# Patient Record
Sex: MALE | Race: OTHER | HISPANIC OR LATINO | Employment: UNEMPLOYED | URBAN - METROPOLITAN AREA
[De-identification: names, ages, dates, MRNs, and addresses within clinical notes are randomized per-mention and may not be internally consistent; named-entity substitution may affect disease eponyms.]

---

## 2020-09-21 ENCOUNTER — APPOINTMENT (EMERGENCY)
Dept: RADIOLOGY | Facility: HOSPITAL | Age: 45
End: 2020-09-21

## 2020-09-21 ENCOUNTER — HOSPITAL ENCOUNTER (EMERGENCY)
Facility: HOSPITAL | Age: 45
Discharge: HOME | End: 2020-09-21
Attending: EMERGENCY MEDICINE | Admitting: EMERGENCY MEDICINE

## 2020-09-21 VITALS
TEMPERATURE: 97.6 F | OXYGEN SATURATION: 99 % | WEIGHT: 174.38 LBS | DIASTOLIC BLOOD PRESSURE: 65 MMHG | RESPIRATION RATE: 18 BRPM | SYSTOLIC BLOOD PRESSURE: 163 MMHG | HEART RATE: 81 BPM

## 2020-09-21 DIAGNOSIS — M25.531 RIGHT WRIST PAIN: Primary | ICD-10-CM

## 2020-09-21 PROCEDURE — 99284 EMERGENCY DEPT VISIT MOD MDM: CPT | Performed by: EMERGENCY MEDICINE

## 2020-09-21 PROCEDURE — 99283 EMERGENCY DEPT VISIT LOW MDM: CPT

## 2020-09-21 PROCEDURE — 73110 X-RAY EXAM OF WRIST: CPT

## 2020-09-21 RX ORDER — IBUPROFEN 600 MG/1
600 TABLET ORAL ONCE
Status: COMPLETED | OUTPATIENT
Start: 2020-09-21 | End: 2020-09-21

## 2020-09-21 RX ADMIN — IBUPROFEN 600 MG: 600 TABLET, FILM COATED ORAL at 22:48

## 2020-09-21 NOTE — Clinical Note
Boom Holloway was seen and treated in our emergency department on 9/21/2020  Diagnosis:     Ciaran Charles  may return to work on return date  He may return on this date: 09/24/2020         If you have any questions or concerns, please don't hesitate to call        Arvind Quintanilla MD    ______________________________           _______________          _______________  Hospital Representative                              Date                                Time

## 2020-09-22 NOTE — ED PROVIDER NOTES
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  Pain is worse with movement  No redness, swelling, numbness, tingling, weakness  No hx prior injury  No falls or direct trauma  No other complaints at this time  None       History reviewed  No pertinent past medical history  History reviewed  No pertinent surgical history  History reviewed  No pertinent family history  I have reviewed and agree with the history as documented  E-Cigarette/Vaping     E-Cigarette/Vaping Substances     Social History     Tobacco Use    Smoking status: Never Smoker    Smokeless tobacco: Never Used   Substance Use Topics    Alcohol use: Never     Frequency: Never    Drug use: Never        Review of Systems   Constitutional: Negative for chills and fever  HENT: Negative for congestion, rhinorrhea and sore throat  Respiratory: Negative for cough and shortness of breath  Cardiovascular: Negative for chest pain and palpitations  Gastrointestinal: Negative for abdominal pain, nausea and vomiting  Genitourinary: Negative for dysuria and hematuria  Musculoskeletal: Positive for arthralgias and myalgias  Skin: Negative for rash  Neurological: Negative for dizziness, weakness, light-headedness, numbness and headaches  All other systems reviewed and are negative        Physical Exam  ED Triage Vitals   Temperature Pulse Respirations Blood Pressure SpO2   09/21/20 2240 09/21/20 2239 09/21/20 2239 09/21/20 2240 09/21/20 2239   97 6 °F (36 4 °C) 81 18 163/65 99 %      Temp Source Heart Rate Source Patient Position - Orthostatic VS BP Location FiO2 (%)   09/21/20 2240 09/21/20 2239 09/21/20 2239 09/21/20 2239 --   Tympanic Monitor Sitting Left arm       Pain Score       09/21/20 2248       6             Orthostatic Vital Signs  Vitals:    09/21/20 2239 09/21/20 2240   BP:  163/65   Pulse: 81    Patient Position - Orthostatic VS: Sitting        Physical Exam  Vitals signs and nursing note reviewed  Constitutional:       General: He is not in acute distress  Appearance: He is well-developed  He is not diaphoretic  HENT:      Head: Normocephalic and atraumatic  Right Ear: External ear normal       Left Ear: External ear normal    Eyes:      Conjunctiva/sclera: Conjunctivae normal       Pupils: Pupils are equal, round, and reactive to light  Neck:      Musculoskeletal: Normal range of motion and neck supple  Cardiovascular:      Rate and Rhythm: Normal rate and regular rhythm  Heart sounds: Normal heart sounds  No murmur  No friction rub  No gallop  Pulmonary:      Effort: Pulmonary effort is normal  No respiratory distress  Breath sounds: Normal breath sounds  No wheezing, rhonchi or rales  Abdominal:      General: Bowel sounds are normal  There is no distension  Palpations: Abdomen is soft  Tenderness: There is no abdominal tenderness  Musculoskeletal:      Right wrist: He exhibits decreased range of motion (secondary to pain) and tenderness  He exhibits no swelling, no effusion and no deformity  Arms:    Lymphadenopathy:      Cervical: No cervical adenopathy  Skin:     General: Skin is warm and dry  Findings: No abscess or erythema  Neurological:      Mental Status: He is alert and oriented to person, place, and time  Cranial Nerves: No cranial nerve deficit  Sensory: No sensory deficit           ED Medications  Medications   ibuprofen (MOTRIN) tablet 600 mg (600 mg Oral Given 9/21/20 2248)       Diagnostic Studies  Results Reviewed     None                 XR wrist 3+ views RIGHT   ED Interpretation by Chuy Gooden MD (09/21 2309)   No fracture or dislocation Procedures  Procedures      ED Course                                       MDM  38 yo male with wrist pain  Will XR to evaluate for bony injury  Likely musculoskeletal/soft tissue  Will treat with ibuprofen and ice  Pt will likely be discharged with PCP follow up and return precautions  Disposition  Final diagnoses:   Right wrist pain     Time reflects when diagnosis was documented in both MDM as applicable and the Disposition within this note     Time User Action Codes Description Comment    9/21/2020 11:10 PM Adama Lugo Ricky [[id number]] Right wrist pain       ED Disposition     ED Disposition Condition Date/Time Comment    Discharge Stable Mon Sep 21, 2020 11:10 PM Cecilia Bose discharge to home/self care  Follow-up Information     Follow up With Specialties Details Why Contact Info Additional [address] [facility]   [address] [facility] [address] [facility] [id number]  [address] [place] [address], [id number] [phone]          There are no discharge medications for this patient  No discharge procedures on file  PDMP Review     None           ED Provider  Attending physically available and evaluated Cecilia Bose I managed the patient along with the ED Attending      Electronically Signed by         Jennifer Merlos MD  09/22/20 4911

## 2020-09-22 NOTE — DISCHARGE INSTRUCTIONS
Take ibuprofen 600mg every 6-8 hours as needed for pain  You may also take tylenol  Rest  Ice  Return to the emergency department for increased swelling, numbness in the hand or fingers, any other new or concerning symptoms

## 2020-09-24 NOTE — ED ATTENDING ATTESTATION
9/21/2020  IKrista MD, saw and evaluated the patient  I have discussed the patient with the resident/non-physician practitioner and agree with the resident's/non-physician practitioner's findings, Plan of Care, and MDM as documented in the resident's/non-physician practitioner's note, except where noted  All available labs and Radiology studies were reviewed  I was present for key portions of any procedure(s) performed by the resident/non-physician practitioner and I was immediately available to provide assistance  At this point I agree with the current assessment done in the Emergency Department  I have conducted an independent evaluation of this patient a history and physical is as follows:    ED Course     Patient presents for evaluation of R wrist pain  Denies trauma  No prior episode of same  No additional complaints  Exam: AAOx3, NAD, R distal radius with tenderness and mild swelling, no erythema  A/P: Wrist pain  Will check xray to r/o occult abnormality       Critical Care Time  Procedures